# Patient Record
Sex: MALE | URBAN - METROPOLITAN AREA
[De-identification: names, ages, dates, MRNs, and addresses within clinical notes are randomized per-mention and may not be internally consistent; named-entity substitution may affect disease eponyms.]

---

## 2017-07-17 ENCOUNTER — OFFICE VISIT (OUTPATIENT)
Dept: DERMATOLOGY | Facility: CLINIC | Age: 22
End: 2017-07-17

## 2017-07-17 DIAGNOSIS — L21.9 DERMATITIS, SEBORRHEIC: Primary | ICD-10-CM

## 2017-07-17 RX ORDER — LORATADINE 10 MG/1
10 TABLET ORAL DAILY
COMMUNITY

## 2017-07-17 RX ORDER — KETOCONAZOLE 20 MG/ML
SHAMPOO TOPICAL DAILY PRN
Qty: 120 ML | Refills: 11 | Status: SHIPPED | OUTPATIENT
Start: 2017-07-17

## 2017-07-17 ASSESSMENT — PAIN SCALES - GENERAL: PAINLEVEL: NO PAIN (0)

## 2017-07-17 NOTE — PROGRESS NOTES
"[facility] Dermatology Note      Dermatology Problem List:  1.  Seborrheic Dermatitis- Start Ketoconazole Shampoo 2% every other day to scalp and rinse. Daily to facial areas, ears.     CC:   Chief Complaint   Patient presents with     Derm Problem     Toro is here with \" Dry, red flakey skin on scalp and on eyebrows.\"         Encounter Date: Jul 17, 2017    History of Present Illness:  Mr. Toro Hills is a 22 year old male who presents in self referral for redness and flaking to his scalp, bearded areas, eyebrows and ears.   He first noticed it on the scalp and then spread to the face, eyebrows, glabella, then under eyes, in ears. He shampoos daily with a Neutragena shampoo which helps reduce his symptoms on his scalp. He is feeling well otherwise, no other skin complaints.     Past Medical History:   There is no problem list on file for this patient.    Past Medical History:   Diagnosis Date     Uncomplicated asthma      No past surgical history on file.    Social History:  The patient works as a . In his last year of undergraduate.  The patient denies use of tanning beds.    Family History:  There is a family history of non-melanoma skin cancer in the patient's paternal grandfather.   Father has had eczema as a child and thinks he has psoriasis now. .    Medications:  Current Outpatient Prescriptions   Medication Sig Dispense Refill     Montelukast Sodium (SINGULAIR PO) Take 10 mg by mouth Pt. Does not know dose       loratadine (CLARITIN) 10 MG tablet Take 10 mg by mouth daily       No Known Allergies      Review of Systems:  -Skin/Heme New Pt: The patient denies frequent sun exposure. The patient denies excessive scarring or problems healing except as per HPI. The patient denies excessive bleeding.  -Constitutional: The patient denies fatigue, fevers, chills, unintended weight loss, and night sweats.  -HEENT: Patient denies nonhealing oral sores.  -Skin: As above in HPI. No " additional skin concerns.    Physical exam:  Vitals: There were no vitals taken for this visit.  GEN: This is a well developed, well-nourished male in no acute distress, in a pleasant mood.    SKIN: Sun-exposed skin, which includes the head/face, neck, both arms, digits, and/or nails was examined.   -There is macular erythema of the scalp, eyebrows, bearded area and bi bowls with moderate flaky white scale.  -No other lesions of concern on areas examined.     Impression/Plan:  1. Seborrheic Dermatitis, scalp, face and ears.   Start Ketoconazole Shampoo 2% every other day to the scalp and rinse  Use daily to the areas of the face and ears.   Continue washing the scalp daily. Alternate Ketoconazole shampoo on the scalp with the Neutragena shampoo.     -Discussed chronic nature of condition and may worsen/flare during winter months and stress.  -May require other topicals including steroids.         Staff Involved:  Staff Only    All risk, benefits and alternatives were discussed with patient.  Patient is in agreement and understands the assessment and plan.  All questions were answered.  Sun Screen Education was given.   Return to Clinic in 3 months or sooner as needed (if not improving).   Ying Borrero PA-C

## 2017-07-17 NOTE — MR AVS SNAPSHOT
After Visit Summary   7/17/2017    Toro Hills    MRN: [id number]           Patient Information     Date Of Birth          1995        Visit Information        Provider Department      7/17/2017 4:30 PM Ying Borrero PA-C [facility] Dermatology        Today's Diagnoses     Dermatitis, seborrheic    -  1       Follow-ups after your visit        Follow-up notes from your care team     Return in about 3 months (around 10/17/2017).      Who to contact     Please call your clinic at [phone] to:    Ask questions about your health    Make or cancel appointments    Discuss your medicines    Learn about your test results    Speak to your doctor   If you have compliments or concerns about an experience at your clinic, or if you wish to file a complaint, please contact [facility] Patient Relations at [phone] or email us at [email or web address]         Additional Information About Your Visit        MyChart Information     [employer] gives you secure access to your electronic health record. If you see a primary care provider, you can also send messages to your care team and make appointments. If you have questions, please call your primary care clinic.  If you do not have a primary care provider, please call [phone] and they will assist you.      [employer] is an electronic gateway that provides easy, online access to your medical records. With [employer], you can request a clinic appointment, read your test results, renew a prescription or communicate with your care team.     To access your existing account, please contact your [facility] Clinic or call [phone] for assistance.        Care EveryWhere ID     This is your Care EveryWhere ID. This could be used by other organizations to access your [facility] medical records  [id number]         Blood Pressure from Last 3 Encounters:   No data found for BP    Weight from Last 3  Encounters:   No data found for Wt              Today, you had the following     No orders found for display         Today's Medication Changes          These changes are accurate as of: 7/17/17 11:59 PM.  If you have any questions, ask your nurse or doctor.               Start taking these medicines.        Dose/Directions    ketoconazole 2 % shampoo   Commonly known as:  NIZORAL   Used for:  Dermatitis, seborrheic   Started by:  Ying Borrero PA-C        Apply topically daily as needed for itching or irritation To the face and ears and every other day to the scalp.   Quantity:  120 mL   Refills:  11            Where to get your medicines      These medications were sent to [facility] [address] [place] [address] [address] [place] [address] [address] [facility] [address]     Phone:  [phone]     ketoconazole 2 % shampoo                Primary Care Provider    None Specified       No primary provider on file.        Equal Access to Services     JUNIOR OVALLES : Hadii george gallegoso Soneyda, waaxda luqadaha, qaybta kaalmada adedeyanirayataylor, kaz amaya . So [facility] [phone].    ATENCIÓN: Si habla español, tiene a conway disposición servicios gratuitos de asistencia lingüística. Llame al [phone].    We comply with applicable federal civil rights laws and [place] laws. We do not discriminate on the basis of race, color, national origin, age, disability, sex, sexual orientation, or gender identity.            Thank you!     Thank you for choosing [facility] DERMATOLOGY  for your care. Our goal is always to provide you with excellent care. Hearing back from our patients is one way we can continue to improve our services. Please take a few minutes to complete the written survey that you may receive in the mail after your visit with us. Thank you!             Your Updated Medication List - Protect others around you: Learn how to safely  use, store and throw away your medicines at www.disposemymeds.org.          This list is accurate as of: 7/17/17 11:59 PM.  Always use your most recent med list.                   Brand Name Dispense Instructions for use Diagnosis    ketoconazole 2 % shampoo    NIZORAL    120 mL    Apply topically daily as needed for itching or irritation To the face and ears and every other day to the scalp.    Dermatitis, seborrheic       loratadine 10 MG tablet    CLARITIN     Take 10 mg by mouth daily        SINGULAIR PO      Take 10 mg by mouth Pt. Does not know dose

## 2017-07-17 NOTE — NURSING NOTE
"Dermatology Rooming Note    Toro Hills's goals for this visit include:   Chief Complaint   Patient presents with     Derm Problem     Toro is here with \" Dry, red flakey skin on scalp and on eyebrows.\"     Mague Gruber LPN  "

## 2017-07-17 NOTE — LETTER
Date:October 2, 2017      Patient was self referred, no letter generated. Do not send.        [facility] Health Information

## 2017-07-17 NOTE — LETTER
"7/17/2017       RE: Toro Hills  [address]  [place] [address]     Dear Colleague,    Thank you for referring your patient, Toro Hills, to the [facility] DERMATOLOGY at [facility]. Please see a copy of my visit note below.    [facility] Dermatology Note      Dermatology Problem List:  1.  Seborrheic Dermatitis- Start Ketoconazole Shampoo 2% every other day to scalp and rinse. Daily to facial areas, ears.     CC:   Chief Complaint   Patient presents with     Derm Problem     Toro is here with \" Dry, red flakey skin on scalp and on eyebrows.\"         Encounter Date: Jul 17, 2017    History of Present Illness:  Mr. Toro Hills is a 22 year old male who presents in self referral for redness and flaking to his scalp, bearded areas, eyebrows and ears.   He first noticed it on the scalp and then spread to the face, eyebrows, glabella, then under eyes, in ears. He shampoos daily with a Neutragena shampoo which helps reduce his symptoms on his scalp. He is feeling well otherwise, no other skin complaints.     Past Medical History:   There is no problem list on file for this patient.    Past Medical History:   Diagnosis Date     Uncomplicated asthma      No past surgical history on file.    Social History:  The patient works as a . In his last year of undergraduate.  The patient denies use of tanning beds.    Family History:  There is a family history of non-melanoma skin cancer in the patient's paternal grandfather.   Father has had eczema as a child and thinks he has psoriasis now. .    Medications:  Current Outpatient Prescriptions   Medication Sig Dispense Refill     Montelukast Sodium (SINGULAIR PO) Take 10 mg by mouth Pt. Does not know dose       loratadine (CLARITIN) 10 MG tablet Take 10 mg by mouth daily       No Known Allergies      Review of Systems:  -Skin/Heme New Pt: The patient denies frequent sun exposure. The patient " denies excessive scarring or problems healing except as per HPI. The patient denies excessive bleeding.  -Constitutional: The patient denies fatigue, fevers, chills, unintended weight loss, and night sweats.  -HEENT: Patient denies nonhealing oral sores.  -Skin: As above in HPI. No additional skin concerns.    Physical exam:  Vitals: There were no vitals taken for this visit.  GEN: This is a well developed, well-nourished male in no acute distress, in a pleasant mood.    SKIN: Sun-exposed skin, which includes the head/face, neck, both arms, digits, and/or nails was examined.   -There is macular erythema of the scalp, eyebrows, bearded area and bi bowls with moderate flaky white scale.  -No other lesions of concern on areas examined.     Impression/Plan:  1. Seborrheic Dermatitis, scalp, face and ears.   Start Ketoconazole Shampoo 2% every other day to the scalp and rinse  Use daily to the areas of the face and ears.   Continue washing the scalp daily. Alternate Ketoconazole shampoo on the scalp with the Neutragena shampoo.     -Discussed chronic nature of condition and may worsen/flare during winter months and stress.  -May require other topicals including steroids.         Staff Involved:  Staff Only    All risk, benefits and alternatives were discussed with patient.  Patient is in agreement and understands the assessment and plan.  All questions were answered.  Sun Screen Education was given.   Return to Clinic in 3 months or sooner as needed (if not improving).   Ying Borrero PA-C     Again, thank you for allowing me to participate in the care of your patient.      Sincerely,    Ying Borrero PA-C

## 2017-09-30 PROBLEM — L21.9 DERMATITIS, SEBORRHEIC: Status: ACTIVE | Noted: 2017-09-30

## 2020-03-10 ENCOUNTER — HEALTH MAINTENANCE LETTER (OUTPATIENT)
Age: 25
End: 2020-03-10

## 2020-12-27 ENCOUNTER — HEALTH MAINTENANCE LETTER (OUTPATIENT)
Age: 25
End: 2020-12-27

## 2021-04-24 ENCOUNTER — HEALTH MAINTENANCE LETTER (OUTPATIENT)
Age: 26
End: 2021-04-24

## 2021-10-09 ENCOUNTER — HEALTH MAINTENANCE LETTER (OUTPATIENT)
Age: 26
End: 2021-10-09

## 2022-05-21 ENCOUNTER — HEALTH MAINTENANCE LETTER (OUTPATIENT)
Age: 27
End: 2022-05-21

## 2022-09-17 ENCOUNTER — HEALTH MAINTENANCE LETTER (OUTPATIENT)
Age: 27
End: 2022-09-17

## 2023-06-03 ENCOUNTER — HEALTH MAINTENANCE LETTER (OUTPATIENT)
Age: 28
End: 2023-06-03